# Patient Record
Sex: FEMALE | Race: WHITE | Employment: STUDENT | URBAN - NONMETROPOLITAN AREA
[De-identification: names, ages, dates, MRNs, and addresses within clinical notes are randomized per-mention and may not be internally consistent; named-entity substitution may affect disease eponyms.]

---
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
## 2018-06-21 ENCOUNTER — OFFICE VISIT (OUTPATIENT)
Dept: FAMILY MEDICINE CLINIC | Age: 18
End: 2018-06-21
Payer: COMMERCIAL

## 2018-06-21 ENCOUNTER — NURSE ONLY (OUTPATIENT)
Dept: FAMILY MEDICINE CLINIC | Age: 18
End: 2018-06-21
Payer: COMMERCIAL

## 2018-06-21 VITALS
DIASTOLIC BLOOD PRESSURE: 78 MMHG | SYSTOLIC BLOOD PRESSURE: 104 MMHG | HEIGHT: 67 IN | WEIGHT: 153.2 LBS | RESPIRATION RATE: 12 BRPM | HEART RATE: 68 BPM | BODY MASS INDEX: 24.04 KG/M2

## 2018-06-21 DIAGNOSIS — Z00.00 ANNUAL PHYSICAL EXAM: Primary | ICD-10-CM

## 2018-06-21 DIAGNOSIS — Z23 NEED FOR MENINGOCOCCAL VACCINATION: Primary | ICD-10-CM

## 2018-06-21 DIAGNOSIS — N94.6 DYSMENORRHEA: ICD-10-CM

## 2018-06-21 PROCEDURE — 90621 MENB-FHBP VACC 2/3 DOSE IM: CPT | Performed by: FAMILY MEDICINE

## 2018-06-21 PROCEDURE — 99395 PREV VISIT EST AGE 18-39: CPT | Performed by: FAMILY MEDICINE

## 2018-06-21 PROCEDURE — 90471 IMMUNIZATION ADMIN: CPT | Performed by: FAMILY MEDICINE

## 2018-06-21 RX ORDER — NORGESTIMATE AND ETHINYL ESTRADIOL 0.25-0.035
1 KIT ORAL DAILY
Qty: 1 PACKET | Refills: 11 | Status: SHIPPED | OUTPATIENT
Start: 2018-06-21 | End: 2019-08-08 | Stop reason: ALTCHOICE

## 2018-06-21 ASSESSMENT — PATIENT HEALTH QUESTIONNAIRE - PHQ9
1. LITTLE INTEREST OR PLEASURE IN DOING THINGS: 0
SUM OF ALL RESPONSES TO PHQ9 QUESTIONS 1 & 2: 0
2. FEELING DOWN, DEPRESSED OR HOPELESS: 0
SUM OF ALL RESPONSES TO PHQ QUESTIONS 1-9: 0

## 2018-06-21 ASSESSMENT — ENCOUNTER SYMPTOMS
GASTROINTESTINAL NEGATIVE: 1
RESPIRATORY NEGATIVE: 1

## 2018-12-21 ENCOUNTER — OFFICE VISIT (OUTPATIENT)
Dept: FAMILY MEDICINE CLINIC | Age: 18
End: 2018-12-21
Payer: COMMERCIAL

## 2018-12-21 VITALS
DIASTOLIC BLOOD PRESSURE: 80 MMHG | WEIGHT: 153 LBS | TEMPERATURE: 98.4 F | SYSTOLIC BLOOD PRESSURE: 104 MMHG | RESPIRATION RATE: 16 BRPM | HEART RATE: 92 BPM | BODY MASS INDEX: 23.96 KG/M2

## 2018-12-21 DIAGNOSIS — F41.9 ANXIETY: Primary | ICD-10-CM

## 2018-12-21 PROCEDURE — 99213 OFFICE O/P EST LOW 20 MIN: CPT | Performed by: FAMILY MEDICINE

## 2018-12-21 RX ORDER — HYDROXYZINE HYDROCHLORIDE 10 MG/1
10 TABLET, FILM COATED ORAL 3 TIMES DAILY PRN
Qty: 30 TABLET | Refills: 1 | Status: SHIPPED | OUTPATIENT
Start: 2018-12-21 | End: 2018-12-31

## 2018-12-21 RX ORDER — NORGESTIMATE AND ETHINYL ESTRADIOL 0.25-0.035
KIT ORAL
COMMUNITY
Start: 2018-06-21 | End: 2018-12-21

## 2018-12-23 ASSESSMENT — ENCOUNTER SYMPTOMS
RESPIRATORY NEGATIVE: 1
GASTROINTESTINAL NEGATIVE: 1

## 2019-08-08 ENCOUNTER — OFFICE VISIT (OUTPATIENT)
Dept: FAMILY MEDICINE CLINIC | Age: 19
End: 2019-08-08
Payer: COMMERCIAL

## 2019-08-08 VITALS
BODY MASS INDEX: 24.31 KG/M2 | DIASTOLIC BLOOD PRESSURE: 72 MMHG | RESPIRATION RATE: 16 BRPM | HEART RATE: 80 BPM | WEIGHT: 155.2 LBS | SYSTOLIC BLOOD PRESSURE: 112 MMHG

## 2019-08-08 DIAGNOSIS — N94.6 DYSMENORRHEA: ICD-10-CM

## 2019-08-08 DIAGNOSIS — Z00.00 ANNUAL PHYSICAL EXAM: Primary | ICD-10-CM

## 2019-08-08 PROCEDURE — 99395 PREV VISIT EST AGE 18-39: CPT | Performed by: FAMILY MEDICINE

## 2019-08-08 RX ORDER — HYDROXYZINE HYDROCHLORIDE 10 MG/1
10 TABLET, FILM COATED ORAL 3 TIMES DAILY PRN
COMMUNITY

## 2019-08-08 RX ORDER — MEDROXYPROGESTERONE ACETATE 150 MG/ML
150 INJECTION, SUSPENSION INTRAMUSCULAR
Qty: 1 ML | Refills: 3 | Status: SHIPPED | OUTPATIENT
Start: 2019-08-08 | End: 2021-02-19 | Stop reason: ALTCHOICE

## 2019-08-08 ASSESSMENT — PATIENT HEALTH QUESTIONNAIRE - PHQ9
SUM OF ALL RESPONSES TO PHQ QUESTIONS 1-9: 0
SUM OF ALL RESPONSES TO PHQ9 QUESTIONS 1 & 2: 0
SUM OF ALL RESPONSES TO PHQ QUESTIONS 1-9: 0
1. LITTLE INTEREST OR PLEASURE IN DOING THINGS: 0
2. FEELING DOWN, DEPRESSED OR HOPELESS: 0

## 2019-08-08 ASSESSMENT — ENCOUNTER SYMPTOMS
COUGH: 0
GASTROINTESTINAL NEGATIVE: 1
SHORTNESS OF BREATH: 0

## 2019-08-08 NOTE — PATIENT INSTRUCTIONS
exposed skin. · See a dentist one or two times a year for checkups and to have your teeth cleaned. · Wear a seat belt in the car. Follow your doctor's advice about when to have certain tests. These tests can spot problems early. For everyone  · Cholesterol. Have the fat (cholesterol) in your blood tested after age 21. Your doctor will tell you how often to have this done based on your age, family history, or other things that can increase your risk for heart disease. · Blood pressure. Have your blood pressure checked during a routine doctor visit. Your doctor will tell you how often to check your blood pressure based on your age, your blood pressure results, and other factors. · Vision. Talk with your doctor about how often to have a glaucoma test.  · Diabetes. Ask your doctor whether you should have tests for diabetes. · Colon cancer. Your risk for colorectal cancer gets higher as you get older. Some experts say that adults should start regular screening at age 48 and stop at age 76. Others say to start before age 48 or continue after age 76. Talk with your doctor about your risk and when to start and stop screening. For women  · Breast exam and mammogram. Talk to your doctor about when you should have a clinical breast exam and a mammogram. Medical experts differ on whether and how often women under 50 should have these tests. Your doctor can help you decide what is right for you. · Cervical cancer screening test and pelvic exam. Begin with a Pap test at age 24. The test often is part of a pelvic exam. Starting at age 27, you may choose to have a Pap test, an HPV test, or both tests at the same time (called co-testing). Talk with your doctor about how often to have testing. · Tests for sexually transmitted infections (STIs). Ask whether you should have tests for STIs. You may be at risk if you have sex with more than one person, especially if your partners do not wear condoms.   For men  · Tests for sexually transmitted infections (STIs). Ask whether you should have tests for STIs. You may be at risk if you have sex with more than one person, especially if you do not wear a condom. · Testicular cancer exam. Ask your doctor whether you should check your testicles regularly. · Prostate exam. Talk to your doctor about whether you should have a blood test (called a PSA test) for prostate cancer. Experts differ on whether and when men should have this test. Some experts suggest it if you are older than 39 and are -American or have a father or brother who got prostate cancer when he was younger than 72. When should you call for help? Watch closely for changes in your health, and be sure to contact your doctor if you have any problems or symptoms that concern you. Where can you learn more? Go to [email or web address] org and sign in to your [employer] account. Enter P072 in the [employer] box to learn more about \"Well Visit, Ages 25 to 48: Care Instructions. \"     If you do not have an account, please click on the \"Sign Up Now\" link. Current as of: December 13, 2018  Content Version: 12.1  © 0369-7845 Healthwise, Incorporated. Care instructions adapted under license by [facility] ([facility]). If you have questions about a medical condition or this instruction, always ask your healthcare professional. [address] any warranty or liability for your use of this information.

## 2019-08-08 NOTE — PROGRESS NOTES
normal and breath sounds normal. She has no wheezes. Abdominal: Soft. Bowel sounds are normal. There is no tenderness. There is no rebound and no guarding. Musculoskeletal: She exhibits no edema. Lymphadenopathy:     She has no cervical adenopathy. Neurological: She is alert and oriented to person, place, and time. Skin: Skin is warm and dry. No rash noted. Psychiatric: She has a normal mood and affect. Her behavior is normal.   Nursing note and vitals reviewed. Immunization History   Administered Date(s) Administered    DTaP 02/24/1999, 04/29/1999, 06/25/1999, 07/13/2000, 05/10/2004    HIB PRP-T (ActHIB, Hiberix) 02/24/1999, 04/29/1999, 06/25/1999, 07/13/2000    HPV Quadrivalent (Gardasil) 10/18/2011, 02/22/2012, 06/29/2012    Hepatitis A 06/21/2013, 07/05/2016    Hepatitis B (Engerix-B) 1998, 02/24/1999, 06/25/1999    Influenza 10/18/2011    Influenza Vaccine, unspecified formulation 09/06/2018    MMR 02/11/2000, 05/10/2004    Meningococcal B, Recombinant Georgiapetrae Episcopal) 06/21/2018    Meningococcal MCV4P (Menactra) 10/18/2011, 07/05/2016    Polio IPV (IPOL) 02/24/1999, 04/29/1999, 06/25/1999, 05/10/2004    Rotavirus Pentavalent (RotaTeq) 02/24/1999, 04/29/1999, 06/25/1999    Tdap (Boostrix, Adacel) 10/18/2011    Varicella (Varivax) 02/11/2000, 10/18/2011         Health Maintenance   Topic Date Due    HIV screen  12/10/2013    Chlamydia screen  12/10/2014    Flu vaccine (1) 09/01/2019    DTaP/Tdap/Td vaccine (7 - Td) 10/18/2021    HPV vaccine  Completed    Varicella Vaccine  Completed    Meningococcal (ACWY) Vaccine  Completed    Pneumococcal 0-64 years Vaccine  Aged Out         ASSESSMENT      1. Annual physical exam    2.  Dysmenorrhea        PLAN        OK to stop OCPs and start Depo_provera as below    Requested Prescriptions     Signed Prescriptions Disp Refills    medroxyPROGESTERone (DEPO-PROVERA) 150 MG/ML injection 1 mL 3     Sig: Inject 1 mL into the muscle

## 2019-09-06 ENCOUNTER — PATIENT MESSAGE (OUTPATIENT)
Dept: FAMILY MEDICINE CLINIC | Age: 19
End: 2019-09-06

## 2019-10-02 ENCOUNTER — TELEPHONE (OUTPATIENT)
Dept: FAMILY MEDICINE CLINIC | Age: 19
End: 2019-10-02

## 2020-05-27 ENCOUNTER — TELEPHONE (OUTPATIENT)
Dept: ADMINISTRATIVE | Age: 20
End: 2020-05-27

## 2020-05-27 NOTE — TELEPHONE ENCOUNTER
Pt called and turned 19 and wants to come here to have her PAP test.  She is also on the Depo shot and wants to change to an IUD. Is that something she can do here or does she need to go to the gynecologist for this?

## 2020-08-20 RX ORDER — HYDROXYZINE HYDROCHLORIDE 10 MG/1
TABLET, FILM COATED ORAL
Qty: 30 TABLET | Refills: 1 | OUTPATIENT
Start: 2020-08-20

## 2020-09-25 NOTE — TELEPHONE ENCOUNTER
This medication refill is regarding a refill  Refill requested by [facility] [address]  Requested Prescriptions     Pending Prescriptions Disp Refills    medroxyPROGESTERone (DEPO-PROVERA) 150 MG/ML injection 1 mL 3     Sig: Inject 1 mL into the muscle every 3 months       Date of last visit: 12/21/2018  Date of next visit: Visit date not found  Date of last refill: 8/8/19  Pharmacy Name: [address] [place] [address].

## 2020-10-06 RX ORDER — MEDROXYPROGESTERONE ACETATE 150 MG/ML
150 INJECTION, SUSPENSION INTRAMUSCULAR
Qty: 1 ML | Refills: 3 | OUTPATIENT
Start: 2020-10-06

## 2021-02-19 ENCOUNTER — OFFICE VISIT (OUTPATIENT)
Dept: FAMILY MEDICINE CLINIC | Age: 21
End: 2021-02-19
Payer: COMMERCIAL

## 2021-02-19 ENCOUNTER — NURSE ONLY (OUTPATIENT)
Dept: LAB | Age: 21
End: 2021-02-19

## 2021-02-19 VITALS
WEIGHT: 175.6 LBS | HEIGHT: 67 IN | BODY MASS INDEX: 27.56 KG/M2 | SYSTOLIC BLOOD PRESSURE: 110 MMHG | TEMPERATURE: 97.5 F | RESPIRATION RATE: 12 BRPM | DIASTOLIC BLOOD PRESSURE: 64 MMHG | HEART RATE: 80 BPM

## 2021-02-19 DIAGNOSIS — R19.7 DIARRHEA, UNSPECIFIED TYPE: ICD-10-CM

## 2021-02-19 DIAGNOSIS — Z00.00 ANNUAL PHYSICAL EXAM: Primary | ICD-10-CM

## 2021-02-19 DIAGNOSIS — R63.5 WEIGHT GAIN: ICD-10-CM

## 2021-02-19 DIAGNOSIS — Z00.00 ANNUAL PHYSICAL EXAM: ICD-10-CM

## 2021-02-19 LAB
ALBUMIN SERPL-MCNC: 4.8 G/DL (ref 3.5–5.1)
ALP BLD-CCNC: 81 U/L (ref 38–126)
ALT SERPL-CCNC: 19 U/L (ref 11–66)
ANION GAP SERPL CALCULATED.3IONS-SCNC: 12 MEQ/L (ref 8–16)
AST SERPL-CCNC: 21 U/L (ref 5–40)
BASOPHILS # BLD: 0.6 %
BASOPHILS ABSOLUTE: 0 THOU/MM3 (ref 0–0.1)
BILIRUB SERPL-MCNC: 0.4 MG/DL (ref 0.3–1.2)
BUN BLDV-MCNC: 8 MG/DL (ref 7–22)
CALCIUM SERPL-MCNC: 10 MG/DL (ref 8.5–10.5)
CHLORIDE BLD-SCNC: 104 MEQ/L (ref 98–111)
CO2: 26 MEQ/L (ref 23–33)
CREAT SERPL-MCNC: 0.8 MG/DL (ref 0.4–1.2)
EOSINOPHIL # BLD: 2.2 %
EOSINOPHILS ABSOLUTE: 0.2 THOU/MM3 (ref 0–0.4)
ERYTHROCYTE [DISTWIDTH] IN BLOOD BY AUTOMATED COUNT: 13 % (ref 11.5–14.5)
ERYTHROCYTE [DISTWIDTH] IN BLOOD BY AUTOMATED COUNT: 42.5 FL (ref 35–45)
GFR SERPL CREATININE-BSD FRML MDRD: 90 ML/MIN/1.73M2
GLUCOSE BLD-MCNC: 89 MG/DL (ref 70–108)
HCT VFR BLD CALC: 44.9 % (ref 37–47)
HEMOGLOBIN: 14.3 GM/DL (ref 12–16)
IMMATURE GRANS (ABS): 0.02 THOU/MM3 (ref 0–0.07)
IMMATURE GRANULOCYTES: 0.3 %
LYMPHOCYTES # BLD: 28.8 %
LYMPHOCYTES ABSOLUTE: 2.2 THOU/MM3 (ref 1–4.8)
MCH RBC QN AUTO: 28.5 PG (ref 26–33)
MCHC RBC AUTO-ENTMCNC: 31.8 GM/DL (ref 32.2–35.5)
MCV RBC AUTO: 89.4 FL (ref 81–99)
MONOCYTES # BLD: 6.3 %
MONOCYTES ABSOLUTE: 0.5 THOU/MM3 (ref 0.4–1.3)
NUCLEATED RED BLOOD CELLS: 0 /100 WBC
PLATELET # BLD: 291 THOU/MM3 (ref 130–400)
PMV BLD AUTO: 8.8 FL (ref 9.4–12.4)
POTASSIUM SERPL-SCNC: 4.1 MEQ/L (ref 3.5–5.2)
RBC # BLD: 5.02 MILL/MM3 (ref 4.2–5.4)
SEG NEUTROPHILS: 61.8 %
SEGMENTED NEUTROPHILS ABSOLUTE COUNT: 4.8 THOU/MM3 (ref 1.8–7.7)
SODIUM BLD-SCNC: 142 MEQ/L (ref 135–145)
T4 FREE: 1.38 NG/DL (ref 0.93–1.76)
TOTAL PROTEIN: 7.5 G/DL (ref 6.1–8)
TSH SERPL DL<=0.05 MIU/L-ACNC: 3.82 UIU/ML (ref 0.4–4.2)
WBC # BLD: 7.8 THOU/MM3 (ref 4.8–10.8)

## 2021-02-19 PROCEDURE — 99395 PREV VISIT EST AGE 18-39: CPT | Performed by: FAMILY MEDICINE

## 2021-02-19 RX ORDER — M-VIT,TX,IRON,MINS/CALC/FOLIC 27MG-0.4MG
1 TABLET ORAL DAILY
COMMUNITY

## 2021-02-19 ASSESSMENT — PATIENT HEALTH QUESTIONNAIRE - PHQ9
2. FEELING DOWN, DEPRESSED OR HOPELESS: 0
SUM OF ALL RESPONSES TO PHQ QUESTIONS 1-9: 0

## 2021-02-19 NOTE — PROGRESS NOTES
2021    Kisha Gonsales (:  2000) is a 23 y.o. female, here for a preventive medicine evaluation. She c/o weight gain over the last year. She has stopped DepoProvera and got an IUD to see if the depo was maybe contributing to her weight gain. She admits that she isn't exercising much or watching her diet closely. She c/o alternating diarrhea and constipation and is wondering about a possible food allergy or IBS. She is not ready to see GI yet. She denies black or bloody stool. She would like to do some labs to see if a cause for her symptoms can be found. Nonsmoker. Body mass index is 27.5 kg/m². Review of Systems   Constitutional: Positive for unexpected weight change. Negative for chills, fatigue and fever. HENT: Negative. Eyes: Negative. Respiratory: Negative for shortness of breath. Cardiovascular: Negative for chest pain, palpitations and leg swelling. Gastrointestinal: Positive for constipation and diarrhea. Negative for abdominal pain, blood in stool, nausea and vomiting. Genitourinary: Negative for dysuria. Musculoskeletal: Negative for arthralgias and myalgias. Skin: Negative for rash. Neurological: Negative for dizziness and headaches. Hematological: Negative for adenopathy. Psychiatric/Behavioral: Negative. All other systems reviewed and are negative. Prior to Visit Medications    Medication Sig Taking?  Authorizing Provider   Multiple Vitamins-Minerals (THERAPEUTIC MULTIVITAMIN-MINERALS) tablet Take 1 tablet by mouth daily Yes Historical Provider, MD   vitamin D (CHOLECALCIFEROL) 1000 UNIT TABS tablet Take 1,000 Units by mouth daily Yes Historical Provider, MD   BIOTIN PO Take by mouth daily Yes Historical Provider, MD   hydrOXYzine (ATARAX) 10 MG tablet Take 10 mg by mouth 3 times daily as needed for Itching  Historical Provider, MD   Desloratadine (CLARINEX PO) Take by mouth  Historical Provider, MD CALCIUM PO Take by mouth daily  Historical Provider, MD        Allergies   Allergen Reactions    Seasonal      \"Congestion,sneezing\"  To \"Grasses, Trees, Weeds\"       Past Medical History:   Diagnosis Date    PONV (postoperative nausea and vomiting)     Wrist fracture Age 6    Left        Past Surgical History:   Procedure Laterality Date    TOE SURGERY Left Age 5    Had extra toe removed    TONSILLECTOMY  06/28/2017    Dr. Thaddeus Alegria  Age 10       Social History     Socioeconomic History    Marital status: Single     Spouse name: Not on file    Number of children: Not on file    Years of education: Not on file    Highest education level: Not on file   Occupational History    Not on file   Social Needs    Financial resource strain: Not on file    Food insecurity     Worry: Not on file     Inability: Not on file    Transportation needs     Medical: Not on file     Non-medical: Not on file   Tobacco Use    Smoking status: Never Smoker    Smokeless tobacco: Never Used   Substance and Sexual Activity    Alcohol use: Yes     Comment: social    Drug use: No    Sexual activity: Never   Lifestyle    Physical activity     Days per week: Not on file     Minutes per session: Not on file    Stress: Not on file   Relationships    Social connections     Talks on phone: Not on file     Gets together: Not on file     Attends Mosque service: Not on file     Active member of club or organization: Not on file     Attends meetings of clubs or organizations: Not on file     Relationship status: Not on file    Intimate partner violence     Fear of current or ex partner: Not on file     Emotionally abused: Not on file     Physically abused: Not on file     Forced sexual activity: Not on file   Other Topics Concern    Not on file   Social History Narrative    Not on file        Family History   Problem Relation Age of Onset    High Blood Pressure Maternal Grandmother  High Blood Pressure Maternal Grandfather     Diabetes Maternal Grandfather     High Blood Pressure Paternal Grandmother     High Blood Pressure Paternal Grandfather     Diabetes Paternal Grandfather     Heart Disease Paternal Grandfather     Stroke Paternal Grandfather     Cancer Neg Hx        ADVANCE DIRECTIVE: N, <no information>    Vitals:    02/19/21 1257   BP: 110/64   Pulse: 80   Resp: 12   Temp: 97.5 °F (36.4 °C)   TempSrc: Temporal   Weight: 175 lb 9.6 oz (79.7 kg)   Height: 5' 7\" (1.702 m)     Estimated body mass index is 27.5 kg/m² as calculated from the following:    Height as of this encounter: 5' 7\" (1.702 m). Weight as of this encounter: 175 lb 9.6 oz (79.7 kg). Wt Readings from Last 3 Encounters:   02/19/21 175 lb 9.6 oz (79.7 kg)   08/08/19 155 lb 3.2 oz (70.4 kg)   12/21/18 153 lb (69.4 kg)       Physical Exam  Vitals signs and nursing note reviewed. Constitutional:       General: She is not in acute distress. Appearance: She is well-developed. HENT:      Head: Normocephalic and atraumatic. Right Ear: Tympanic membrane normal.      Left Ear: Tympanic membrane normal.      Mouth/Throat:      Mouth: Mucous membranes are moist.      Pharynx: No posterior oropharyngeal erythema. Eyes:      Conjunctiva/sclera: Conjunctivae normal.   Neck:      Musculoskeletal: Neck supple. Thyroid: No thyromegaly. Vascular: No carotid bruit. Cardiovascular:      Rate and Rhythm: Normal rate and regular rhythm. Heart sounds: No murmur. Pulmonary:      Effort: Pulmonary effort is normal.      Breath sounds: Normal breath sounds. No wheezing. Abdominal:      General: Bowel sounds are normal.      Palpations: Abdomen is soft. Tenderness: There is no abdominal tenderness. There is no guarding or rebound. Musculoskeletal:      Right lower leg: No edema. Left lower leg: No edema. Lymphadenopathy:      Cervical: No cervical adenopathy.    Skin: General: Skin is warm and dry. Findings: No rash. Neurological:      Mental Status: She is alert and oriented to person, place, and time. Psychiatric:         Behavior: Behavior normal.           Immunization History   Administered Date(s) Administered    DTaP 02/24/1999, 04/29/1999, 06/25/1999, 07/13/2000, 05/10/2004    HIB PRP-T (ActHIB, Hiberix) 02/24/1999, 04/29/1999, 06/25/1999, 07/13/2000    HPV Quadrivalent (Gardasil) 10/18/2011, 02/22/2012, 06/29/2012    Hepatitis A 06/21/2013, 07/05/2016    Hepatitis B (Engerix-B) 1998, 02/24/1999, 06/25/1999    Influenza 10/18/2011    Influenza Vaccine, unspecified formulation 09/06/2018    Influenza, Fannin Solders, IM, (6 mo and older Fluzone, Flulaval, Fluarix and 3 yrs and older Afluria) 12/04/2020    Influenza, Quadv, IM, PF (6 mo and older Fluzone, Flulaval, Fluarix, and 3 yrs and older Afluria) 12/23/2019    MMR 02/11/2000, 05/10/2004    Meningococcal B, Recombinant Lv Viridiana) 06/21/2018    Meningococcal MCV4P (Menactra) 10/18/2011, 07/05/2016    Polio IPV (IPOL) 02/24/1999, 04/29/1999, 06/25/1999, 05/10/2004    Rotavirus Pentavalent (RotaTeq) 02/24/1999, 04/29/1999, 06/25/1999    Tdap (Boostrix, Adacel) 10/18/2011    Varicella (Varivax) 02/11/2000, 10/18/2011       Health Maintenance   Topic Date Due    Hepatitis C screen  1998    HIV screen  12/10/2013    Chlamydia screen  12/10/2014    Cervical cancer screen  12/10/2019    DTaP/Tdap/Td vaccine (7 - Td) 10/18/2021    Hepatitis A vaccine  Completed    Hepatitis B vaccine  Completed    Hib vaccine  Completed    HPV vaccine  Completed    Varicella vaccine  Completed    Meningococcal (ACWY) vaccine  Completed    Flu vaccine  Completed    Pneumococcal 0-64 years Vaccine  Aged Out       ASSESSMENT/PLAN:    1. Annual physical exam  -     CBC Auto Differential; Future  -     Comprehensive Metabolic Panel; Future  2.  Weight gain  -     CBC Auto Differential; Future -     Comprehensive Metabolic Panel; Future  -     T4, Free; Future  -     TSH without Reflex; Future  3. Diarrhea, unspecified type  -     Allergen, Food, Comprehensive Profile 1; Future      Proceed with labs as above    Trial of IBGard to possible IBS    Increase activity and limit calories and carbs in diet to control weight    An electronic signature was used to authenticate this note.     --Osorio Mccabe MD on 2/19/2021 at 2:41 PM

## 2021-02-19 NOTE — PATIENT INSTRUCTIONS

## 2021-02-21 ASSESSMENT — ENCOUNTER SYMPTOMS
CONSTIPATION: 1
EYES NEGATIVE: 1
BLOOD IN STOOL: 0
NAUSEA: 0
SHORTNESS OF BREATH: 0
ABDOMINAL PAIN: 0
VOMITING: 0
DIARRHEA: 1

## 2021-02-23 LAB
ALLERGEN BARLEY IGE: < 0.1 KU/L (ref 0–0.34)
ALLERGEN BEEF: < 0.1 KU/L (ref 0–0.34)
ALLERGEN CABBAGE IGE: < 0.1 KU/L (ref 0–0.34)
ALLERGEN CARROT IGE: 0.19 KU/L (ref 0–0.34)
ALLERGEN CHICKEN IGE: < 0.1 KU/L (ref 0–0.34)
ALLERGEN CODFISH IGE: < 0.1 KU/L (ref 0–0.34)
ALLERGEN CORN IGE: < 0.1 KU/L (ref 0–0.34)
ALLERGEN COW MILK IGE: < 0.1 KU/L (ref 0–0.34)
ALLERGEN CRAB IGE: < 0.1 KU/L (ref 0–0.34)
ALLERGEN EGG WHITE IGE: < 0.1 KU/L (ref 0–0.34)
ALLERGEN GRAPE IGE: < 0.1 KU/L (ref 0–0.34)
ALLERGEN LETTUCE IGE: < 0.1 KU/L (ref 0–0.34)
ALLERGEN NAVY BEAN: < 0.1 KU/L (ref 0–0.34)
ALLERGEN OAT: < 0.1 KU/L (ref 0–0.34)
ALLERGEN ORANGE IGE: < 0.1 KU/L (ref 0–0.34)
ALLERGEN PEANUT (F13) IGE: < 0.1 KU/L (ref 0–0.34)
ALLERGEN PEPPER C. ANNUUM IGE: < 0.1 KU/L (ref 0–0.34)
ALLERGEN PORK: < 0.1 KU/L (ref 0–0.34)
ALLERGEN RICE IGE: < 0.1 KU/L (ref 0–0.34)
ALLERGEN RYE IGE: < 0.1 KU/L (ref 0–0.34)
ALLERGEN SOYBEAN IGE: < 0.1 KU/L (ref 0–0.34)
ALLERGEN TOMATO IGE: < 0.1 KU/L (ref 0–0.34)
ALLERGEN TUNA IGE: < 0.1 KU/L (ref 0–0.34)
ALLERGEN WHEAT IGE: < 0.1 KU/L (ref 0–0.34)
IGE: 68 IU/ML
POTATO, IGE: < 0.1 KU/L (ref 0–0.34)
SHRIMP: < 0.1 KU/L (ref 0–0.34)

## 2021-03-27 ENCOUNTER — IMMUNIZATION (OUTPATIENT)
Dept: PRIMARY CARE CLINIC | Age: 21
End: 2021-03-27
Payer: COMMERCIAL

## 2021-03-27 PROCEDURE — 91300 COVID-19, PFIZER VACCINE 30MCG/0.3ML DOSE: CPT | Performed by: FAMILY MEDICINE

## 2021-03-27 PROCEDURE — 0001A COVID-19, PFIZER VACCINE 30MCG/0.3ML DOSE: CPT | Performed by: FAMILY MEDICINE

## 2021-04-17 ENCOUNTER — IMMUNIZATION (OUTPATIENT)
Dept: PRIMARY CARE CLINIC | Age: 21
End: 2021-04-17
Payer: COMMERCIAL

## 2021-04-17 PROCEDURE — 91300 COVID-19, PFIZER VACCINE 30MCG/0.3ML DOSE: CPT

## 2021-04-17 PROCEDURE — 0002A COVID-19, PFIZER VACCINE 30MCG/0.3ML DOSE: CPT

## 2021-06-28 ENCOUNTER — TELEPHONE (OUTPATIENT)
Dept: FAMILY MEDICINE CLINIC | Age: 21
End: 2021-06-28

## 2021-06-28 NOTE — TELEPHONE ENCOUNTER
----- Message from Skyrider sent at 6/28/2021 11:49 AM EDT -----  Subject: Referral Request    QUESTIONS   Reason for referral request? Needs referral to  in [place] to get Mole   removed   Has the physician seen you for this condition before? No   Preferred Specialist (if applicable)? Do you already have an appointment scheduled? No  Additional Information for Provider? Patient requested Dr. Meli Camacho for mole   removal at [address]  ---------------------------------------------------------------------------  --------------  [address]  What is the best way for the office to contact you? OK to leave message on   voicemail  Preferred Call Back Phone Number?  [phone]

## 2021-06-28 NOTE — TELEPHONE ENCOUNTER
Spoke with patient and she states that this office does require a referral.  I attempted to call but cannot find a valid phone #. Attempted to call patient to see what number she has been using for contact but had to leave a message to call back.

## 2021-06-28 NOTE — TELEPHONE ENCOUNTER
Jenniffer Khan for referral?    Will need to call patient back to get more specific info. (location of mole?  H/o skin cancer?)

## 2021-06-29 NOTE — TELEPHONE ENCOUNTER
Spoke with patient and she provided an office number of [phone]. ([address] [place] [address])    I called this number and was told that provider not accepting new pt appts until October. VV scheduled with CG later this month to see if mole can be removed in our office.

## 2021-07-15 ENCOUNTER — VIRTUAL VISIT (OUTPATIENT)
Dept: FAMILY MEDICINE CLINIC | Age: 21
End: 2021-07-15
Payer: COMMERCIAL

## 2021-07-15 DIAGNOSIS — D22.5 ATYPICAL NEVUS OF THORACIC REGION: Primary | ICD-10-CM

## 2021-07-15 PROCEDURE — 99213 OFFICE O/P EST LOW 20 MIN: CPT | Performed by: FAMILY MEDICINE

## 2021-07-15 ASSESSMENT — ENCOUNTER SYMPTOMS: GASTROINTESTINAL NEGATIVE: 1

## 2021-07-15 NOTE — PROGRESS NOTES
7/15/2021          Patient location:  Home  Provider location:  [facility]  Accept Video Visit Billing:  Yes    TELEHEALTH EVALUATION -- Audio/Visual (During ZZRBT-06 public health emergency)    Rod Cruz (:  2000) is a 23 y.o. female,Established patient, here for evaluation of the following chief complaint(s): Mole (Changing mole)        ASSESSMENT/PLAN:    1. Atypical nevus of thoracic region      She will be scheduled for lesion excision at her convenience. Risks and benefits of the procedure discussed including the risk of scarring. She elects to proceed. Appointment scheduled. SUBJECTIVE/OBJECTIVE:    HPI:    Kisha Gonsales (:  2000) has requested an audio/video evaluation for the following concern(s):    Patient complains of an irregular and dark mole on her left rib cage that has been present over the past few years. She recently noticed that it is more irregular, larger, and darker. She has no history of blistering sunburns in the past.  She desires removal.    Review of Systems   Constitutional: Negative. HENT: Negative. Cardiovascular: Negative. Gastrointestinal: Negative. Musculoskeletal: Negative. Skin:        Irregular, dark mole   All other systems reviewed and are negative. Outpatient Medications Prior to Visit   Medication Sig Dispense Refill    Multiple Vitamins-Minerals (THERAPEUTIC MULTIVITAMIN-MINERALS) tablet Take 1 tablet by mouth daily      hydrOXYzine (ATARAX) 10 MG tablet Take 10 mg by mouth 3 times daily as needed for Itching      Desloratadine (CLARINEX PO) Take by mouth      vitamin D (CHOLECALCIFEROL) 1000 UNIT TABS tablet Take 1,000 Units by mouth daily      CALCIUM PO Take by mouth daily      BIOTIN PO Take by mouth daily       No facility-administered medications prior to visit.        Social History     Tobacco Use    Smoking status: Never Smoker    Smokeless tobacco: Never Used   Vaping Use    Vaping Use: Never used   Substance Use Topics    Alcohol use: Yes     Comment: social    Drug use: No          PHYSICAL EXAMINATION:  [ INSTRUCTIONS:  \"[x]\" Indicates a positive item  \"[]\" Indicates a negative item  -- DELETE ALL ITEMS NOT EXAMINED]  Vital Signs: (As obtained by patient/caregiver or practitioner observation)     Blood pressure-          Heart rate-         Respiratory rate-         Temperature-            Pulse oximetry-      Constitutional: [x] Appears well-developed and well-nourished [x] No apparent distress                            [] Abnormal-   Mental status  [x] Alert and awake  [x] Oriented to person/place/time [x]Able to follow commands       Eyes:  EOM    [x]  Normal  [] Abnormal-  Sclera  [x]  Normal  [] Abnormal -         Discharge [x]  None visible  [] Abnormal -     HENT:   [x] Normocephalic, atraumatic. [] Abnormal   [x] Mouth/Throat: Mucous membranes are moist.      External Ears [x] Normal  [] Abnormal-      Neck: [x] No visualized mass      Pulmonary/Chest: [x] Respiratory effort normal.  [x] No visualized signs of difficulty breathing or respiratory distress        [] Abnormal-      Musculoskeletal:   [] Normal gait with no signs of ataxia         [x] Normal range of motion of neck        [] Abnormal-         Neurological:        [x] No Facial Asymmetry (Cranial nerve 7 motor function) (limited exam to video visit)                       [x] No gaze palsy        [] Abnormal-         Skin:                     [] No significant exanthematous lesions or discoloration noted on facial skin         [x] Abnormal- dark and irregular nevus noted on the left rib area. Psychiatric:           [x] Normal Affect [] No Hallucinations        [] Abnormal-      Other pertinent observable physical exam findings- none        Gretchen Gibbons is a 23 y.o. female being evaluated by a Virtual Visit (video visit) encounter to address concerns as mentioned above.   A caregiver was present when appropriate. Due to this being a TeleHealth encounter (During [facility] public health emergency), evaluation of the following organ systems was limited: Vitals/Constitutional/EENT/Resp/CV/GI//MS/Neuro/Skin/Heme-Lymph-Imm. Pursuant to the emergency declaration under the [address] [place] [address], [address] [place] [address] and the [employer] Act, this Virtual Visit was conducted with patient's (and/or legal guardian's) consent, to reduce the patient's risk of exposure to COVID-19 and provide necessary medical care. The patient (and/or legal guardian) has also been advised to contact this office for worsening conditions or problems, and seek emergency medical treatment and/or call 911 if deemed necessary. Patient identification was verified at the start of the visit: Yes    Total time spent on this encounter: Not billed by time    Services were provided through a video synchronous discussion virtually to substitute for in-person clinic visit. --Alisson Vergara MD on 7/15/2021 at 3:39 PM    An electronic signature was used to authenticate this note.

## 2021-07-23 ENCOUNTER — OFFICE VISIT (OUTPATIENT)
Dept: FAMILY MEDICINE CLINIC | Age: 21
End: 2021-07-23
Payer: COMMERCIAL

## 2021-07-23 VITALS
DIASTOLIC BLOOD PRESSURE: 62 MMHG | BODY MASS INDEX: 27.69 KG/M2 | HEART RATE: 80 BPM | RESPIRATION RATE: 16 BRPM | WEIGHT: 176.8 LBS | SYSTOLIC BLOOD PRESSURE: 116 MMHG

## 2021-07-23 DIAGNOSIS — D22.5 ATYPICAL NEVUS OF THORACIC REGION: ICD-10-CM

## 2021-07-23 DIAGNOSIS — D22.9 COMPOUND NEVUS: Primary | ICD-10-CM

## 2021-07-23 PROCEDURE — 11401 EXC TR-EXT B9+MARG 0.6-1 CM: CPT | Performed by: FAMILY MEDICINE

## 2021-07-23 NOTE — PROGRESS NOTES
2021    Kisha Gonsales (:  2000) is a 23 y.o. female,Established patient, here for evaluation of the following chief complaint(s): Mole (Here for mole removal, mole is on right side of ribcage. )      ASSESSMENT/PLAN:    1. Atypical nevus of thoracic region      Wound care instructions given    Specimen sent to pathology. We will notify her of results when available. Sutures out 7 to 10 days    Follow-up for any problems    SUBJECTIVE/OBJECTIVE:    HPI    Patient presents today for excision of an atypical mole in the right anterior chest.  The mole has been changing in size, shape, and color the patient desires removal.  Risks and benefits discussed and consent obtained. Review of Systems     Negative except as noted in HPI. Vitals:    21 1143   BP: 116/62   Pulse: 80   Resp: 16   Weight: 176 lb 12.8 oz (80.2 kg)       Wt Readings from Last 3 Encounters:   21 176 lb 12.8 oz (80.2 kg)   21 175 lb 9.6 oz (79.7 kg)   19 155 lb 3.2 oz (70.4 kg)       BP Readings from Last 3 Encounters:   21 116/62   21 110/64   19 112/72       Physical Exam  Vitals reviewed. Constitutional:       General: She is not in acute distress. Skin:         Neurological:      Mental Status: She is alert. An electronic signature was used to authenticate this note.         Electronically signed by Laurent Yeager MD on 2021 at 12:46 PM     Addendum:    Pathology reviewed as below:    [facility] Pathology     [facility]                  [phone]   Assoc.                                              Page 1 of 2 [address]   [facility], [address] [place] [address]: 2021   [place]                                    RECV: 2021   [address]                                    RPTD: 2021   [facility], [address] [address]                       MRN:  [id number]       LOC: [facility]                       ACCT:

## 2021-09-03 ENCOUNTER — PATIENT MESSAGE (OUTPATIENT)
Dept: FAMILY MEDICINE CLINIC | Age: 21
End: 2021-09-03

## 2021-09-03 ENCOUNTER — E-VISIT (OUTPATIENT)
Dept: FAMILY MEDICINE CLINIC | Age: 21
End: 2021-09-03
Payer: COMMERCIAL

## 2021-09-03 DIAGNOSIS — J01.41 ACUTE RECURRENT PANSINUSITIS: Primary | ICD-10-CM

## 2021-09-03 PROCEDURE — 99421 OL DIG E/M SVC 5-10 MIN: CPT | Performed by: FAMILY MEDICINE

## 2021-09-03 RX ORDER — AMOXICILLIN AND CLAVULANATE POTASSIUM 875; 125 MG/1; MG/1
1 TABLET, FILM COATED ORAL 2 TIMES DAILY
Qty: 20 TABLET | Refills: 0 | Status: SHIPPED | OUTPATIENT
Start: 2021-09-03 | End: 2021-09-13

## 2021-09-03 ASSESSMENT — LIFESTYLE VARIABLES: SMOKING_STATUS: NO, I'VE NEVER SMOKED

## 2021-09-03 NOTE — PROGRESS NOTES
18yo female with history as below submitted per Johana Shafer encounter:    Patient Questionnaire Submission  --------------------------------     Questionnaire:  TEODORO SINUS/COUGH/COLD QUESTIONNAIRE 1     Question: Have you been experiencing nasal congestion? Answer: No     Question: When you blow your nose, what color is the mucus? Answer:   No mucus comes out when I blow my nose     Question: Have you had pain or pressure around your nose and face or do your upper teeth hurt? Answer: No     Question: Has your throat been sore? Answer: No     Question: Have you noticed any swollen glands in your neck? Answer: No     Question: Have you been sneezing? Answer: No     Question: Have you been coughing? Answer: No     Questionnaire:  TEODORO SINUS/COUGH/COLD QUESTIONNAIRE 3     Question: Have you been hoarse or lost your voice? Answer: No     Question: Have your ears been bothering you? Answer: No     Question: If yes, please describe. Answer:        Question: Have your eyes been bothering you? Answer: No     Question: If yes, please describe. Answer:        Question: Have you been experiencing significant body aches? Answer: No     Question: Have you had severe headaches of stiff neck? Answer: No     Question: Have you been experiencing consistent nausea, vomitting, or dizziness? Answer: No     Question: Have you been experiencing swelling of your mouth or tongue, or difficulty swallowing? Answer: No     Question: Have you been experiencing chest pain or shortness of breath? Answer: No     Question: Have you developed a new rash? Answer: No     Question: How long have you been having these symptoms? Answer:   10 days     Question: Have you been having fevers? Answer:   No, I have not had any fevers     Questionnaire:  TEODORO SINUS/COUGH/COLD QUESTIONNAIRE 5     Question: Do you currently smoke?    Answer:   No, I've never smoked     Questionnaire: QUIANA SHAFER were spent on the digital evaluation and management of this patient.

## 2021-09-03 NOTE — TELEPHONE ENCOUNTER
From: Girish Pena  To: Shade Ritchie MD  Sent: 9/3/2021 3:48 AM EDT  Subject: Prescription Question    Hello! I'm trying to set up an e-visit but it's not letting me. I have a really nasty cold; it's not covid (I was tested for it), but I think I have a bacterial infection and need antibiotics. My boyfriend has been sick for 15 days, I've been sick for 10. I can't stop coughing up mucus, I'm losing my voice, my sinuses are really infected (there's a lot of pressure in my face and it's constantly draining down the back of my throat), my lymph nodes are swollen, I temporarily got better and then got worse again, and neither me nor my boyfriend are showing any signs of the cold going away. I haven't had a fever and I don't think it is strep, but I feel really terrible and haven't been able to go to class.

## 2022-05-04 ENCOUNTER — NURSE TRIAGE (OUTPATIENT)
Dept: OTHER | Facility: CLINIC | Age: 22
End: 2022-05-04

## 2022-05-04 NOTE — TELEPHONE ENCOUNTER
Limited triage due to caller not with patient    Subjective: Caller states \"twisted ankle while running\"     Current Symptoms:   Unable to walk on ankle - think she broke a few bones  [place] a snap     Onset:  Prior to call    Associated Symptoms: na     Pain Severity: KIERA    Temperature:  Na     What has been tried: [facility] [place]     LMP: kiera Pregnant: kiera    Recommended disposition: Go to ED Now    Care advice provided, patient verbalizes understanding; denies any other questions or concerns; instructed to call back for any new or worsening symptoms. Pt will be going to nearest ED in [facility]    Attention Provider: Thank you for allowing me to participate in the care of your patient. The patient was connected to triage in response to symptoms provided. Please do not respond through this encounter as the response is not directed to a shared pool.       Reason for Disposition   Serious injury with multiple fractures (broken bones)    Protocols used: ANKLE AND FOOT INJURY-ADULT-

## 2023-07-18 ENCOUNTER — TELEMEDICINE (OUTPATIENT)
Dept: PSYCHOLOGY | Age: 23
End: 2023-07-18
Payer: COMMERCIAL

## 2023-07-18 DIAGNOSIS — F90.0 ADHD, PREDOMINANTLY INATTENTIVE TYPE: Primary | ICD-10-CM

## 2023-07-18 PROCEDURE — 90791 PSYCH DIAGNOSTIC EVALUATION: CPT | Performed by: PSYCHOLOGIST

## 2023-07-18 PROCEDURE — 96130 PSYCL TST EVAL PHYS/QHP 1ST: CPT | Performed by: PSYCHOLOGIST

## 2023-07-18 NOTE — PROGRESS NOTES
Psychological Evaluation  Windy Gillespie, PhD   Visit Date:  7/18/2023    Patient:  Salas Matthew  YOB: 2000  Reason for referral:  ADHD and emotional issues  Duration of session:  60 minutes with patient and 60 minutes scoring tests, compiling results and reporting. Relevant Background Information    Description:    MSE:    Appearance    cooperative  Appetite normal  Sleep disturbance No  Loss of pleasure No  Speech    normal rate, normal volume, and well articulated  Mood    Anxious  Affect    anxiety  Thought Content    intact  Insight    Fair  Judgment    Intact  Suicide Assessment    no suicidal ideation  Homicidal Assessment Intent No  Cutting No  Enuresis No  Learning Disorder assessment for ADHD and learning disability today  Memory immediate, short-term and long-term memories intact  CognitiveIntact long-term and Intact short-term  Hallucination Absent  DelusionsAbsent        Methods of Evaluation  Review of  school records  Biographical Information Form  MMPI-2  [facility] ADD Scales--Adult       Test Findings  Biographical Information Form  Patient is 23years of age,  and weighs approximately 178 pounds. She is stands 5 foot 7 inches high. He is currently engaged to be . She stated that she is currently not in therapy. She did receive therapy in the past for anxiety and tried some the techniques and they were helpful. She states she is having difficulty focusing and forgets deadlines. She has trouble with being easily distracted and feels irritated and anxious. He is always forgetful and has to compensate by writing things down. Whenever there is background noise things touching her that she gets distracted. She has a difficult time with multitasking. She states that these symptoms have been occurring since she was in early childhood.   They get better whenever she is in a quiet place, has a little limited number of things to do, has limited

## 2023-09-19 ENCOUNTER — OFFICE VISIT (OUTPATIENT)
Dept: PSYCHOLOGY | Age: 23
End: 2023-09-19
Payer: COMMERCIAL

## 2023-09-19 DIAGNOSIS — F90.0 ADHD, PREDOMINANTLY INATTENTIVE TYPE: Primary | ICD-10-CM

## 2023-09-19 PROCEDURE — 90832 PSYTX W PT 30 MINUTES: CPT | Performed by: PSYCHOLOGIST

## 2023-09-19 NOTE — PROGRESS NOTES
Behavioral Health Consultation  Gardenia Galvin, PhD  Psychologist  9/19/2023       Time spent with Patient:  30 minutes  This is patient's second  [facility] appointment. Reason for Consult:  stress  Referring Provider: Arnel Poon DO  [address] [place] [address] Valentino Floyd    Pt provided informed consent for the behavioral health program. Discussed with patient model of service to include the limits of confidentiality (i.e. abuse reporting, suicide intervention, etc.) and short-term intervention focused approach. Pt indicated understanding. Feedback given to PCP. Description:    MSE:    Appearance    cooperative  Appetite normal  Sleep disturbance No  Loss of pleasure No  Speech    normal rate, normal volume, and well articulated  Mood    Depressed  Affect    normal affect  Thought Content    intact  Insight    Good  Judgment    Intact  Suicide Assessment    no suicidal ideation  Homicidal Assessment Intent No  Cutting No  Enuresis No  Learning Disorder ADHD      History:    Medications:   Current Outpatient Medications   Medication Sig Dispense Refill    Multiple Vitamins-Minerals (THERAPEUTIC MULTIVITAMIN-MINERALS) tablet Take 1 tablet by mouth daily      hydrOXYzine (ATARAX) 10 MG tablet Take 10 mg by mouth 3 times daily as needed for Itching      Desloratadine (CLARINEX PO) Take by mouth      vitamin D (CHOLECALCIFEROL) 1000 UNIT TABS tablet Take 1,000 Units by mouth daily      CALCIUM PO Take by mouth daily      BIOTIN PO Take by mouth daily       No current facility-administered medications for this visit.        Social History:   Social History     Socioeconomic History    Marital status:      Spouse name: Not on file    Number of children: Not on file    Years of education: Not on file    Highest education level: Not on file   Occupational History    Not on file   Tobacco Use    Smoking status: Never    Smokeless tobacco: Never   Vaping Use    Vaping Use: Never used   Substance and